# Patient Record
Sex: MALE | Race: ASIAN | NOT HISPANIC OR LATINO | URBAN - METROPOLITAN AREA
[De-identification: names, ages, dates, MRNs, and addresses within clinical notes are randomized per-mention and may not be internally consistent; named-entity substitution may affect disease eponyms.]

---

## 2018-04-02 ENCOUNTER — INPATIENT HOSPITAL (INPATIENT)
Dept: URBAN - METROPOLITAN AREA HOSPITAL 11 | Facility: HOSPITAL | Age: 58
End: 2018-04-02

## 2018-04-02 DIAGNOSIS — D62 ACUTE POSTHEMORRHAGIC ANEMIA: ICD-10-CM

## 2018-04-02 DIAGNOSIS — K92.1 MELENA: ICD-10-CM

## 2018-04-02 DIAGNOSIS — K29.60 OTHER GASTRITIS WITHOUT BLEEDING: ICD-10-CM

## 2018-04-02 DIAGNOSIS — K29.80 DUODENITIS WITHOUT BLEEDING: ICD-10-CM

## 2018-04-02 DIAGNOSIS — K92.2 GASTROINTESTINAL HEMORRHAGE, UNSPECIFIED: ICD-10-CM

## 2018-04-02 PROCEDURE — 43235 EGD DIAGNOSTIC BRUSH WASH: CPT

## 2018-04-02 PROCEDURE — 99254 IP/OBS CNSLTJ NEW/EST MOD 60: CPT | Mod: 25

## 2021-12-07 ENCOUNTER — OFFICE (INPATIENT)
Dept: URBAN - METROPOLITAN AREA CLINIC 34 | Facility: CLINIC | Age: 61
End: 2021-12-07

## 2021-12-07 VITALS
WEIGHT: 194 LBS | DIASTOLIC BLOOD PRESSURE: 90 MMHG | HEART RATE: 71 BPM | SYSTOLIC BLOOD PRESSURE: 145 MMHG | HEIGHT: 65 IN | TEMPERATURE: 98.1 F

## 2021-12-07 DIAGNOSIS — K76.0 FATTY (CHANGE OF) LIVER, NOT ELSEWHERE CLASSIFIED: ICD-10-CM

## 2021-12-07 DIAGNOSIS — R74.8 ABNORMAL LEVELS OF OTHER SERUM ENZYMES: ICD-10-CM

## 2021-12-07 DIAGNOSIS — F10.120 ALCOHOL ABUSE WITH INTOXICATION, UNCOMPLICATED: ICD-10-CM

## 2021-12-07 DIAGNOSIS — K76.89 OTHER SPECIFIED DISEASES OF LIVER: ICD-10-CM

## 2021-12-07 PROCEDURE — 99205 OFFICE O/P NEW HI 60 MIN: CPT

## 2021-12-07 NOTE — SERVICEHPINOTES
ANUPAMA SANCHEZ   is a   61   year old male who is being seen in consultation at the request of   KARI SANDERSON   for liver concerns. Presents with wife.
br
Reports a long history of heavy alcohol use--reports drinking 6 cans of beer daily since he was in college. Occasionally quits for a little then restarts. Does get withdrawal symptoms when he tries to stop. br 
Thinks he may have had elevated LFTs in the past. GI-wise, he is asymptomatic. No abd pain, n/v, h/o jaundice, memory issues, (new) sleeping difficulties. Has gained weight within the past year or so due to excess calories, previously weighed about 170, today is 194. Nonsmoker.br No family hx of liver disease or cirrhosis.
br 
br5/2020 US showed diffuse hepatic steatosis, complex cystic hepatic lesion (left lobe, 3.8 x 2.0 x 2.3 cm). 
 Had a colonoscopy approx 3 years ago through [facility] in [place], believes he is due now or soon as he was told to return in 3 years. BMs regular. Denies rectal bleeding or melena. No family hx of colon cancer.

## 2022-02-28 ENCOUNTER — OFFICE (INPATIENT)
Dept: URBAN - METROPOLITAN AREA CLINIC 102 | Facility: CLINIC | Age: 62
End: 2022-02-28

## 2022-02-28 DIAGNOSIS — R74.8 ABNORMAL LEVELS OF OTHER SERUM ENZYMES: ICD-10-CM

## 2022-02-28 DIAGNOSIS — K76.0 FATTY (CHANGE OF) LIVER, NOT ELSEWHERE CLASSIFIED: ICD-10-CM

## 2022-02-28 PROCEDURE — 91200 LIVER ELASTOGRAPHY: CPT | Performed by: INTERNAL MEDICINE

## 2023-06-05 ENCOUNTER — NEW PATIENT (OUTPATIENT)
Dept: URBAN - METROPOLITAN AREA CLINIC 66 | Facility: CLINIC | Age: 63
End: 2023-06-05

## 2023-06-05 DIAGNOSIS — H33.311: ICD-10-CM

## 2023-06-05 DIAGNOSIS — H43.812: ICD-10-CM

## 2023-06-05 DIAGNOSIS — H11.31: ICD-10-CM

## 2023-06-05 DIAGNOSIS — H43.821: ICD-10-CM

## 2023-06-05 DIAGNOSIS — H25.13: ICD-10-CM

## 2023-06-05 DIAGNOSIS — S05.11XA: ICD-10-CM

## 2023-06-05 PROCEDURE — 67145 PROPH RTA DTCHMNT PC: CPT

## 2023-06-05 PROCEDURE — 99204 OFFICE O/P NEW MOD 45 MIN: CPT | Mod: 25

## 2023-06-05 PROCEDURE — 92201 OPSCPY EXTND RTA DRAW UNI/BI: CPT | Mod: 59

## 2023-06-05 PROCEDURE — 92134 CPTRZ OPH DX IMG PST SGM RTA: CPT

## 2023-06-05 ASSESSMENT — TONOMETRY
OD_IOP_MMHG: 10
OS_IOP_MMHG: 16

## 2023-06-05 ASSESSMENT — VISUAL ACUITY
OS_CC: 20/25+2
OD_CC: 20/20-2

## 2023-07-17 ENCOUNTER — FOLLOW UP (OUTPATIENT)
Dept: URBAN - METROPOLITAN AREA CLINIC 66 | Facility: CLINIC | Age: 63
End: 2023-07-17

## 2023-07-17 DIAGNOSIS — H33.311: ICD-10-CM

## 2023-07-17 PROCEDURE — 92201 OPSCPY EXTND RTA DRAW UNI/BI: CPT

## 2023-07-17 PROCEDURE — 92014 COMPRE OPH EXAM EST PT 1/>: CPT

## 2023-07-17 ASSESSMENT — TONOMETRY: OD_IOP_MMHG: 15

## 2023-07-17 ASSESSMENT — VISUAL ACUITY: OD_CC: 20/20-2

## 2024-05-15 ENCOUNTER — OFFICE (INPATIENT)
Dept: URBAN - METROPOLITAN AREA CLINIC 34 | Facility: CLINIC | Age: 64
End: 2024-05-15

## 2024-05-15 VITALS
TEMPERATURE: 97.9 F | DIASTOLIC BLOOD PRESSURE: 78 MMHG | SYSTOLIC BLOOD PRESSURE: 122 MMHG | HEART RATE: 68 BPM | HEIGHT: 65 IN | WEIGHT: 164 LBS

## 2024-05-15 DIAGNOSIS — I25.10 ATHEROSCLEROTIC HEART DISEASE OF NATIVE CORONARY ARTERY WITH: ICD-10-CM

## 2024-05-15 DIAGNOSIS — F10.120 ALCOHOL ABUSE WITH INTOXICATION, UNCOMPLICATED: ICD-10-CM

## 2024-05-15 DIAGNOSIS — K26.9 DUODENAL ULCER, UNSPECIFIED AS ACUTE OR CHRONIC, WITHOUT HEM: ICD-10-CM

## 2024-05-15 DIAGNOSIS — R74.8 ABNORMAL LEVELS OF OTHER SERUM ENZYMES: ICD-10-CM

## 2024-05-15 DIAGNOSIS — E87.1 HYPO-OSMOLALITY AND HYPONATREMIA: ICD-10-CM

## 2024-05-15 DIAGNOSIS — K25.9 GASTRIC ULCER, UNSPECIFIED AS ACUTE OR CHRONIC, WITHOUT HEMO: ICD-10-CM

## 2024-05-15 DIAGNOSIS — Z12.11 ENCOUNTER FOR SCREENING FOR MALIGNANT NEOPLASM OF COLON: ICD-10-CM

## 2024-05-15 DIAGNOSIS — D64.9 ANEMIA, UNSPECIFIED: ICD-10-CM

## 2024-05-15 PROCEDURE — 99215 OFFICE O/P EST HI 40 MIN: CPT | Performed by: NURSE PRACTITIONER

## 2024-05-15 RX ORDER — PANTOPRAZOLE SODIUM 40 MG/1
80 TABLET, DELAYED RELEASE ORAL
Qty: 180 | Refills: 3 | Status: ACTIVE
Start: 2024-05-15

## 2024-05-15 NOTE — SERVICEHPINOTES
ANUPAMA SANCHEZ   is a   63   male here for er follow up. Went to er for vomting, melena and weakness found to be anemic and had  1 unit blood transfusion.  br
Had an EGD 4/3 and showed many non bleeding ulcers, one clotted gastric ulcer with adherent clot, lesion was 7mm and area was injected with epi for hemostasis and clips placed. br
br Denies rectal bleeding, melena,n/v, hematemesis, abdominal pain, dysphagia, 
br Has a BM daily, BSS 4br Has lost 15 pounds in a month,  br stools are yellow
br 
Does not use advil but was on aspirin but stopped since discharge from hospital.br
br 
No longer taking PPI as he ran out. br
brWas drinking daily- 6 can of beer a day and stopped for 1 month
br
br Last colonoscopy not sure when br
br br